# Patient Record
Sex: MALE | Race: OTHER
[De-identification: names, ages, dates, MRNs, and addresses within clinical notes are randomized per-mention and may not be internally consistent; named-entity substitution may affect disease eponyms.]

---

## 2019-07-01 ENCOUNTER — HOSPITAL ENCOUNTER (EMERGENCY)
Dept: HOSPITAL 74 - JER | Age: 39
Discharge: HOME | End: 2019-07-01
Payer: COMMERCIAL

## 2019-07-01 VITALS — DIASTOLIC BLOOD PRESSURE: 74 MMHG | HEART RATE: 104 BPM | TEMPERATURE: 97.8 F | SYSTOLIC BLOOD PRESSURE: 143 MMHG

## 2019-07-01 VITALS — BODY MASS INDEX: 44.4 KG/M2

## 2019-07-01 DIAGNOSIS — R10.31: Primary | ICD-10-CM

## 2019-07-01 LAB
ALBUMIN SERPL-MCNC: 3.5 G/DL (ref 3.4–5)
ALP SERPL-CCNC: 128 U/L (ref 45–117)
ALT SERPL-CCNC: 52 U/L (ref 13–61)
ANION GAP SERPL CALC-SCNC: 6 MMOL/L (ref 8–16)
APPEARANCE UR: CLEAR
AST SERPL-CCNC: 34 U/L (ref 15–37)
BASOPHILS # BLD: 0.8 % (ref 0–2)
BILIRUB SERPL-MCNC: 0.4 MG/DL (ref 0.2–1)
BILIRUB UR STRIP.AUTO-MCNC: NEGATIVE MG/DL
BUN SERPL-MCNC: 10.2 MG/DL (ref 7–18)
CALCIUM SERPL-MCNC: 8.8 MG/DL (ref 8.5–10.1)
CHLORIDE SERPL-SCNC: 104 MMOL/L (ref 98–107)
CO2 SERPL-SCNC: 28 MMOL/L (ref 21–32)
COLOR UR: YELLOW
CREAT SERPL-MCNC: 1 MG/DL (ref 0.55–1.3)
DEPRECATED RDW RBC AUTO: 13.9 % (ref 11.9–15.9)
EOSINOPHIL # BLD: 1.6 % (ref 0–4.5)
GLUCOSE SERPL-MCNC: 183 MG/DL (ref 74–106)
HCT VFR BLD CALC: 44.4 % (ref 35.4–49)
HGB BLD-MCNC: 14.9 GM/DL (ref 11.7–16.9)
KETONES UR QL STRIP: (no result)
LEUKOCYTE ESTERASE UR QL STRIP.AUTO: NEGATIVE
LIPASE SERPL-CCNC: 209 U/L (ref 73–393)
LYMPHOCYTES # BLD: 29.6 % (ref 8–40)
MCH RBC QN AUTO: 29.6 PG (ref 25.7–33.7)
MCHC RBC AUTO-ENTMCNC: 33.5 G/DL (ref 32–35.9)
MCV RBC: 88.2 FL (ref 80–96)
MONOCYTES # BLD AUTO: 7.1 % (ref 3.8–10.2)
NEUTROPHILS # BLD: 60.9 % (ref 42.8–82.8)
NITRITE UR QL STRIP: NEGATIVE
PH UR: 6 [PH] (ref 5–8)
PLATELET # BLD AUTO: 212 K/MM3 (ref 134–434)
PMV BLD: 9.6 FL (ref 7.5–11.1)
POTASSIUM SERPLBLD-SCNC: 4.2 MMOL/L (ref 3.5–5.1)
PROT SERPL-MCNC: 7.1 G/DL (ref 6.4–8.2)
PROT UR QL STRIP: NEGATIVE
PROT UR QL STRIP: NEGATIVE
RBC # BLD AUTO: 5.03 M/MM3 (ref 4–5.6)
SODIUM SERPL-SCNC: 138 MMOL/L (ref 136–145)
SP GR UR: 1.02 (ref 1.01–1.03)
UROBILINOGEN UR STRIP-MCNC: 1 MG/DL (ref 0.2–1)
WBC # BLD AUTO: 9.2 K/MM3 (ref 4–10)

## 2019-07-01 PROCEDURE — 3E033GC INTRODUCTION OF OTHER THERAPEUTIC SUBSTANCE INTO PERIPHERAL VEIN, PERCUTANEOUS APPROACH: ICD-10-PCS

## 2019-07-01 PROCEDURE — 3E0333Z INTRODUCTION OF ANTI-INFLAMMATORY INTO PERIPHERAL VEIN, PERCUTANEOUS APPROACH: ICD-10-PCS

## 2019-07-01 NOTE — PDOC
*Physical Exam





- Vital Signs


 Last Vital Signs











Temp Pulse Resp BP Pulse Ox


 


 97.8 F   104 H  18   143/74   94 L


 


 07/01/19 08:42  07/01/19 08:42  07/01/19 08:42  07/01/19 08:42  07/01/19 08:42














ED Treatment Course





- LABORATORY


CBC & Chemistry Diagram: 


 07/01/19 09:35





 07/01/19 09:35





Medical Decision Making





- Medical Decision Making





07/01/19 09:23


39 yo M presenting to the ER with a complaint of testicular pain





Pt seen by Midlevel Provider under my direct supervision


Pt interviewed and examined


Ancillary studies reviewed


   Scrotal US- nml, no torsion, no masses, no varicocele


   CT - no stones, CT does not comment on presence of hernia





I agree with plan as outlined by Midlevel Provider


D/c with prompt PMD follow up 


Return for worsening symptoms


07/01/19 11:32





07/01/19 11:34





07/01/19 11:35





07/01/19 12:09








*DC/Admit/Observation/Transfer


Diagnosis at time of Disposition: 


 Abdominal pain








- Discharge Dispostion


Disposition: HOME


Condition at time of disposition: Improved





- Prescriptions


Prescriptions: 


Ondansetron HCl [Zofran] 4 mg PO TID PRN #12 tablet


 PRN Reason: Nausea And/Or Vomiting





- Referrals


Referrals: 


Napoleon Blevins MD [Primary Care Provider] - 





- Patient Instructions


Printed Discharge Instructions:  DI for Abdominal Pain-Adult


Additional Instructions: 


Take Zofran as needed for nausea. 


May take Aleve for discomfort.


Please bring copy of your labs and imaging results with you to Dr. Blevins's 

office on July 3rd. 





- Post Discharge Activity

## 2019-07-01 NOTE — PDOC
History of Present Illness





- General


Chief Complaint: Pain, Acute


Stated Complaint: TESTICULAR PAIN/ ABD.PAIN


Time Seen by Provider: 07/01/19 09:00


History Source: Patient


Exam Limitations: No Limitations





- History of Present Illness


Travel History: No


Initial Comments: 





07/01/19 09:07


38-year-old male presents to ED with complaints of lower abdomen sharp pressure 

worsen in the testicle area radiating to his right flank area. Patient states 

approximately one week ago went to his primary care doctor which had noted 

blood in his urine without continuation of workup including imaging. Patient 

denies history of renal colic, gallstones, recent travel, GI history, fever, 

chills, diarrhea but does state nausea. patient denies any protrusion to his 

abdomen, penile discharge or dysuria.


Timing/Duration: reports: getting worse


Quality: reports: moderate


Abdominal Pain Onset Location: reports: flank


Pain Radiation: reports: RLQ, other (testicles)


Aggravating Factors: improves with: None


Alleviating Factors: improves with: None





Past History





- Travel


Traveled outside of the country in the last 30 days: No


Close contact w/someone who was outside of country & ill: No





- Past Medical History


Allergies/Adverse Reactions: 


 Allergies











Allergy/AdvReac Type Severity Reaction Status Date / Time


 


Sulfa (Sulfonamide Allergy   Verified 07/01/19 08:45





Antibiotics)     











Home Medications: 


Ambulatory Orders





Divalproex *ER* [Depakote *ER* -] 250 mg PO BID #14 tablet. 05/19/14 


Divalproex *ER* [Depakote *ER* -] 250 mg PO DAILY #15 tablet. 05/19/14 


Phenobarbital 64.8 mg PO BID 05/19/14 


levETIRAcetam [Keppra -] 2,000 mg PO BID 05/19/14 


levETIRAcetam [Keppra -] 2,000 mg PO BID #56 tablet 05/19/14 


levETIRAcetam [Keppra -] 500 mg PO BID #60 tablet 05/19/14 








COPD: No


Seizures: Yes





- Surgical History


Cholecystectomy: Yes





- Suicide/Smoking/Psychosocial Hx


Smoking History: Never smoked


Number of Cigarettes Smoked Daily: 0


Hx Alcohol Use: Yes


Drug/Substance Use Hx: No


Patient Lives Alone: No


Lives with/in: spouse/SO





**Review of Systems





- Review of Systems


Able to Perform ROS?: Yes


Constitutional: No: Symptoms Reported


HEENTM: No: Symptoms Reported


Respiratory: No: Symptoms reported


Cardiac (ROS): No: Symptoms Reported


ABD/GI: Yes: Nausea, Abdominal cramping


: Yes: Flank Pain, Hematuria, Testicular Pain.  No: Dysuria, Discharge, 

Testicular Mass, Testicular Swelling, Lesions


Musculoskeletal: No: Symptoms Reported


Integumentary: No: Symptoms Reported


Neurological: No: Symptoms reported


Endocrine: No: Symptoms Reported


Hematologic/Lymphatic: No: Symptoms Reported





*Physical Exam





- Vital Signs


 Last Vital Signs











Temp Pulse Resp BP Pulse Ox


 


 97.8 F   104 H  18   143/74   94 L


 


 07/01/19 08:42  07/01/19 08:42  07/01/19 08:42  07/01/19 08:42  07/01/19 08:42














- Physical Exam


General Appearance: Yes: Nourished, Appropriately Dressed.  No: Apparent 

Distress


HEENT: negative: Pale Conjunctivae


Respiratory/Chest: positive: Lungs Clear, Normal Breath Sounds.  negative: 

Respiratory Distress, Accessory Muscle Use


Cardiovascular: positive: Regular Rhythm, Tachycardia.  negative: Murmur


Gastrointestinal/Abdominal: positive: Soft, Tenderness (right upper quadrant. 

positive Richardson's)


Male Genitalia: positive: normal genitalia.  negative: discharge, testicular 

tenderness, testicular mass, epididymus tender


Musculoskeletal: positive: CVA Tenderness (R)


Extremity: positive: Normal Inspection


Integumentary: positive: Normal Color, Warm, Moist


Neurologic: positive: Motor Strength 5/5 (ambulatory)





ED Treatment Course





- LABORATORY


CBC & Chemistry Diagram: 


 07/01/19 09:35





 07/01/19 09:35





- RADIOLOGY


Radiology Studies Ordered: 














 Category Date Time Status


 


 SPIRAL- RENAL-STONE CT [CT] Stat CT Scan  07/01/19 09:04 Ordered


 


 SCROTUM AND CONTENTS US [US] Stat Ultrasound  07/01/19 09:02 Ordered














Medical Decision Making





- Medical Decision Making





07/01/19 09:30


IChief complaint: Right flank pain with nausea radiating to his testicle region 

greater in the right testicle. Patient denies any other symptoms except for 

noted hematuria at his PCPs office last week


Exam: Right upper quadrant right flank and right suprapubic pain. No testicular 

edema or mass palpable. No discharge 


plan: labs, urine, antiemetics,Toradol, IV fluids scrotal ultrasound along with 

spiral CT


07/01/19 11:45


 Laboratory Tests











  07/01/19 07/01/19 07/01/19





  09:35 09:35 09:35


 


WBC   9.2 


 


Hgb   14.9 


 


Hct   44.4 


 


Absolute Neuts (auto)   5.6 


 


Sodium    138


 


Potassium    4.2


 


Chloride    104


 


Carbon Dioxide    28


 


Anion Gap    6 L


 


BUN    10.2


 


Creatinine    1.0


 


Est GFR (CKD-EPI)AfAm    111.73


 


Est GFR (CKD-EPI)NonAf    96.40


 


Random Glucose    183 H


 


Calcium    8.8


 


Total Bilirubin    0.4


 


AST    34


 


ALT    52


 


Alkaline Phosphatase    128 H


 


Total Protein    7.1


 


Albumin    3.5


 


Lipase    209


 


Urine Ketones  Trace H  


 


Urine Nitrite  Negative  


 


Ur Leukocyte Esterase  Negative  








Scrotal ultrasound negative for acute pathology. Abdominal CT shows multiple 

mildly prominent right lower quadrant mesenteric lymph nodes seen. Correlation 

with 3 month follow-up MRI or CT suggested to document stability. Case 

discussed with patient and has a follow-up appointment with his PCP on July 3rd 

will be given copy of all labs and results.


Patient otherwise states nausea and pain has subsided. 


Will discharge patient home with Zofran.








*DC/Admit/Observation/Transfer


Diagnosis at time of Disposition: 


 Abdominal pain








- Discharge Dispostion


Disposition: HOME


Condition at time of disposition: Improved





- Referrals


Referrals: 


Napoleon Blevisn MD [Primary Care Provider] - 





- Patient Instructions


Printed Discharge Instructions:  DI for Abdominal Pain-Adult


Additional Instructions: 


Take Zofran as needed for nausea. 


May take Aleve for discomfort.


Please bring copy of your labs and imaging results with you to Dr. Blevins's 

office on July 3rd. 





- Post Discharge Activity

## 2020-01-02 ENCOUNTER — HOSPITAL ENCOUNTER (OUTPATIENT)
Dept: HOSPITAL 74 - JER | Age: 40
Setting detail: OBSERVATION
LOS: 2 days | Discharge: HOME | End: 2020-01-04
Attending: FAMILY MEDICINE | Admitting: GENERAL ACUTE CARE HOSPITAL
Payer: COMMERCIAL

## 2020-01-02 VITALS — BODY MASS INDEX: 44.1 KG/M2

## 2020-01-02 DIAGNOSIS — Z88.2: ICD-10-CM

## 2020-01-02 DIAGNOSIS — R74.0: ICD-10-CM

## 2020-01-02 DIAGNOSIS — G40.802: Primary | ICD-10-CM

## 2020-01-02 LAB
ALBUMIN SERPL-MCNC: 3.9 G/DL (ref 3.4–5)
ALP SERPL-CCNC: 165 U/L (ref 45–117)
ALT SERPL-CCNC: 131 U/L (ref 13–61)
ANION GAP SERPL CALC-SCNC: 11 MMOL/L (ref 8–16)
AST SERPL-CCNC: 70 U/L (ref 15–37)
BASOPHILS # BLD: 0.5 % (ref 0–2)
BILIRUB SERPL-MCNC: 0.8 MG/DL (ref 0.2–1)
BUN SERPL-MCNC: 8.8 MG/DL (ref 7–18)
CALCIUM SERPL-MCNC: 9 MG/DL (ref 8.5–10.1)
CHLORIDE SERPL-SCNC: 105 MMOL/L (ref 98–107)
CO2 SERPL-SCNC: 22 MMOL/L (ref 21–32)
CREAT SERPL-MCNC: 1.2 MG/DL (ref 0.55–1.3)
DEPRECATED RDW RBC AUTO: 13.5 % (ref 11.9–15.9)
EOSINOPHIL # BLD: 0.6 % (ref 0–4.5)
GLUCOSE SERPL-MCNC: 144 MG/DL (ref 74–106)
HCT VFR BLD CALC: 47.2 % (ref 35.4–49)
HGB BLD-MCNC: 15.5 GM/DL (ref 11.7–16.9)
LYMPHOCYTES # BLD: 32.7 % (ref 8–40)
MCH RBC QN AUTO: 28.5 PG (ref 25.7–33.7)
MCHC RBC AUTO-ENTMCNC: 32.8 G/DL (ref 32–35.9)
MCV RBC: 86.9 FL (ref 80–96)
MONOCYTES # BLD AUTO: 5.2 % (ref 3.8–10.2)
NEUTROPHILS # BLD: 61 % (ref 42.8–82.8)
PLATELET # BLD AUTO: 269 K/MM3 (ref 134–434)
PMV BLD: 9.9 FL (ref 7.5–11.1)
POTASSIUM SERPLBLD-SCNC: 3.9 MMOL/L (ref 3.5–5.1)
PROT SERPL-MCNC: 7.8 G/DL (ref 6.4–8.2)
RBC # BLD AUTO: 5.43 M/MM3 (ref 4–5.6)
SODIUM SERPL-SCNC: 138 MMOL/L (ref 136–145)
WBC # BLD AUTO: 12.2 K/MM3 (ref 4–10)

## 2020-01-02 PROCEDURE — 3E0337Z INTRODUCTION OF ELECTROLYTIC AND WATER BALANCE SUBSTANCE INTO PERIPHERAL VEIN, PERCUTANEOUS APPROACH: ICD-10-PCS | Performed by: FAMILY MEDICINE

## 2020-01-02 PROCEDURE — G0378 HOSPITAL OBSERVATION PER HR: HCPCS

## 2020-01-02 PROCEDURE — 3E033NZ INTRODUCTION OF ANALGESICS, HYPNOTICS, SEDATIVES INTO PERIPHERAL VEIN, PERCUTANEOUS APPROACH: ICD-10-PCS | Performed by: FAMILY MEDICINE

## 2020-01-02 PROCEDURE — 3E013GC INTRODUCTION OF OTHER THERAPEUTIC SUBSTANCE INTO SUBCUTANEOUS TISSUE, PERCUTANEOUS APPROACH: ICD-10-PCS | Performed by: FAMILY MEDICINE

## 2020-01-02 PROCEDURE — 3E023GC INTRODUCTION OF OTHER THERAPEUTIC SUBSTANCE INTO MUSCLE, PERCUTANEOUS APPROACH: ICD-10-PCS | Performed by: FAMILY MEDICINE

## 2020-01-02 RX ADMIN — LEVETIRACETAM SCH MG: 100 INJECTION, SOLUTION, CONCENTRATE INTRAVENOUS at 23:09

## 2020-01-02 RX ADMIN — HEPARIN SODIUM SCH UNIT: 5000 INJECTION, SOLUTION INTRAVENOUS; SUBCUTANEOUS at 23:09

## 2020-01-02 NOTE — PDOC
Documentation entered by Marija Condon SCRIBE, acting as scribe for Desmond Nails MD.








Desmond Nails MD:  This documentation has been prepared by the [employer] rankin Brenda, SCRIBE, under my direction and personally reviewed by me in its entirety.  I 

confirm that the documentation accurately reflects all work, treatment, 

procedures, and medical decision making performed by me.  





Attending Attestation





- Resident


Resident Name: Noe Kaiser





- ED Attending Attestation


I have performed the following: I have examined & evaluated the patient, The 

case was reviewed & discussed with the resident, I agree w/resident's findings 

& plan, Exceptions are as noted





- HPI


HPI: 





01/02/20 16:17


39 M with h/o epilepsy on keppra presenting to ED with increased seizure 

frequency. Per mother, pt has been having frequent partial seizures. He will 

have twitching movements and stare into space. Mother reports several over the 

past week. Today, pt had a GTC, prompting her to call EMS. She also notes pt 

has had 1 week of nasal congestion and subjective fevers. 


In ED, pt initially presented somnolent and post-ictal. However, while in ED he 

had witnessed GTC, followed by post-ictal period during which he was extremely 

agitated, requiring both physical and chemical restraints.





Upon reassessment, pt is more awake and cooperative. Now only complains of pain 

in both shoulders. Denies F/C. Denies Cp/SOB. Denies HA/N/V. Denies neck 

stiffness.








- Physicial Exam


PE: 





01/02/20 16:21


GENERAL: Awake, alert, and fully oriented, in no acute distress.


HEAD: No signs of trauma


EYES: PERRLA, EOMI, sclera anicteric, conjunctiva clear


ENT: Auricles normal inspection, hearing grossly normal, nares patent, 

oropharynx clear without exudates. Moist mucosa


NECK: Nontender, no stepoffs, Normal ROM, supple, no lymphadenopathy, JVD, or 

masses


LUNGS: Breath sounds equal, clear to auscultation bilaterally.  No wheezes, and 

no crackles


HEART: Regular rate and rhythm, normal S1 and S2, no murmurs, rubs or gallops


ABDOMEN: Soft, nontender, normoactive bowel sounds.  No guarding, no rebound.  

No masses


EXTREMITIES: Normal range of motion, no edema.  No clubbing or cyanosis. No 

cords, erythema, or tenderness


NEUROLOGICAL: Cranial nerves II through XII intact. 5/5 strength and sensation 

in all extremities, Normal speech, normal gait, normal cerebellar function


SKIN: Warm, Dry, normal turgor, no rashes or lesions noted.





- Critical Care Time


Total Critical Care Time: 60


Critical Care Statement: The care of this patient involved high complexity 

decision making to prevent further life threatening deterioration of the patient

's condition and/or to evaluate & treat vital organ system(s) failure or risk 

of failure.





- Medical Decision Making





01/02/20 16:23


39 M with increased seizure frequency. Will check for infectious/metabolic 

process.


- Labs


- CT head


- Neuro c/s

## 2020-01-02 NOTE — CON.NEURO
Consult


Consult Specialty:: [facility]


Referred by:: ER Dr West 





- History of Present Illness


History of Present Illness: 





39 years old man with history of Epispsy and Sulfa allergy came in with 

breakthrough seizure 


Patient was tappered off VPA as he wa sdoing well 


Now on LKeppra I spoke to ER 


Patient was reloaded on Keppra 


No seizure since admission 


Patient seen on [facility] tele 


Noted [facility] head CT results 








- History Source


History Provided By: Patient, Family Member


Limitations to Obtaining History: No Limitations





- Past Medical History


CNS: Yes: Seizure





- Past Surgical History


Past Surgical History: Yes: Cholecystectomy





- Alcohol/Substance Use


Hx Alcohol Use: Yes


History of Substance Use: reports: None





- Smoking History


Smoking history: Never smoked


Aproximately how many cigarettes per day: 0





- Social History


ADL: Independent


History of Recent Travel: No





Home Medications





- Allergies


Allergies/Adverse Reactions: 


 Allergies











Allergy/AdvReac Type Severity Reaction Status Date / Time


 


Sulfa (Sulfonamide Allergy   Verified 01/02/20 12:47





Antibiotics)     














- Home Medications


Home Medications: 


Ambulatory Orders





D-Methorphan/PE/Acetaminophen [Theraflu Expressmax Cold-Cough] 245.5 ml PO PRN 

01/02/20 


Guaifenesin [Robitussin] 10 ml PO PRN 01/02/20 


Multivitamin [Multiple Vitamins] 1 each PO DAILY 01/02/20 


Naproxen Sodium [Aleve] 220 mg PO PRN 01/02/20 


levETIRAcetam [Keppra -] 1,000 mg PO BID 01/02/20 


levETIRAcetam [Keppra -] 250 mg PO BID 01/02/20 











Family Medical History


Family History: Unremarkable





Review of Systems





- Review of Systems


Constitutional: reports: No Symptoms


Eyes: reports: No Symptoms


HENT: reports: No Symptoms


Neurological: reports: No Symptoms





Physical Exam-Neuro


Vital Signs: 


 Vital Signs











Temperature  97.8 F   01/02/20 18:30


 


Pulse Rate  96 H  01/02/20 18:30


 


Respiratory Rate  20   01/02/20 18:30


 


Blood Pressure  116/41 L  01/02/20 18:30


 


O2 Sat by Pulse Oximetry (%)  100   01/02/20 18:30











Constitutional: Yes: Well Nourished


Neck: Yes: WNL


Labs: 


 CBC, BMP





 01/02/20 14:45 





 01/02/20 14:45 











- Neuro Exam


Level Of Consciousness: Yes: Oriented to Person, Oriented to Place, Oriented to 

Time


Eyes: Yes: PERRLA


Speech: WNL


Dominant Hand: Right


Cranial Nerves II-XII Intact: Yes


Gag: Present


DTR's: 1+ Left Bicep, 1+ Right Bicep, 1+ Left Brachioradialis, 1+ Right 

Brachioradialis


Response to light touch: Normal


Response to pain prick: Normal


Response to temperature: Normal


Motor Strength: 3/5: Left Arm, Right Arm, Left Leg, Right Leg


Gait: Deferred





Imaging





- Results


Cat Scan: Image Reviewed





Problem List





- Problems


(1) Seizure


Assessment/Plan: 


Breakthrough seizure on monotherapy with Keppra 


probbaly due to recent flu or cold 


High LFTs they were normal 07/2019


Keppra does not affect the liver 





1. Neuro cheks 


2. Keppra 1500 mg po q12


3. Hepatitis profile 


4. GGT 


5. Seizure precautions


6. Ativan prn seizure 








Thank you for [facility] kind referral 








Aramis Allison 





Code(s): R56.9 - UNSPECIFIED CONVULSIONS   


Qualifiers: 


   Convulsion type: unspecified   Qualified Code(s): R56.9 - Unspecified 

convulsions

## 2020-01-02 NOTE — HP
Admitting History and Physical





- Primary Care Physician


PCP: Dr. Ortega 





- Admission


Chief Complaint: Seizures


History of Present Illness: 





39 year old male with PMHx epilepsy presenting with seizure. Pt is poor 

historian, doesn't remember symptoms. Spoke to mother at bedside, stated 

patient had been coughing/nasal congestion and subjective fever for about 1 week

, and multiple petit mal seizures (amnesia, blank stares) lasting up to 5 secs. 

Patient has been taking dayquil without relief. Patient states he is compliant 

with medication when asked, has been tapered off depakote from November - mid 

December under Dr Allison's guidance due to side effects. Currently only taking 

keppra for anti-seizure. Mother noted patient usually has multiple petit 

seizures before grand seizure lasting >20min. Patient denies alcohol, illicit 

drug use. Denies chest pain, SOB, urinary/bowel changes.





History Source: Patient, Family Member


Limitations to Obtaining History: Clinical Condition, Other (recieved history 

from mother at bedside)





- Past Medical History


CNS: Yes: Seizure





- Past Surgical History


Past Surgical History: Yes: Cholecystectomy





- Smoking History


Smoking history: Never smoked


Aproximately how many cigarettes per day: 0





- Alcohol/Substance Use


Hx Alcohol Use: Yes


History of Substance Use: reports: None





- Social History


Usual Living Arrangement: Yes: With Parent


ADL: Independent


History of Recent Travel: No





Home Medications





- Allergies


Allergies/Adverse Reactions: 


 Allergies











Allergy/AdvReac Type Severity Reaction Status Date / Time


 


Sulfa (Sulfonamide Allergy   Verified 20 12:47





Antibiotics)     














- Home Medications


Home Medications: 


Ambulatory Orders





D-Methorphan/PE/Acetaminophen [Theraflu Expressmax Cold-Cough] 245.5 ml PO PRN 

20 


Guaifenesin [Robitussin] 10 ml PO PRN 20 


Multivitamin [Multiple Vitamins] 1 each PO DAILY 20 


Naproxen Sodium [Aleve] 220 mg PO PRN 20 


levETIRAcetam [Keppra -] 1,000 mg PO BID 20 


levETIRAcetam [Keppra -] 250 mg PO BID 20 











Family Medical History


Family Hx Cardiac Disorders: Father ( with MI)





Review of Systems





- Review of Systems


Constitutional: reports: Fever


Eyes: reports: No Symptoms


HENT: reports: Nasal Congestion


Neck: reports: No Symptoms


Cardiovascular: reports: No Symptoms


Respiratory: reports: No Symptoms


Gastrointestinal: reports: No Symptoms


Genitourinary: reports: No Symptoms


Musculoskeletal: reports: No Symptoms


Integumentary: reports: No Symptoms


Neurological: reports: Seizure


Endocrine: reports: No Symptoms


Hematology/Lymphatic: reports: No Symptoms


Psychiatric: reports: No Symptoms





Physical Examination


Vital Signs: 


 Vital Signs











Temperature  97.8 F   20 18:30


 


Pulse Rate  96 H  20 18:30


 


Respiratory Rate  20   20 18:30


 


Blood Pressure  116/41 L  20 18:30


 


O2 Sat by Pulse Oximetry (%)  100   20 18:30











Constitutional: Yes: Mild Distress, Obese


Eyes: Yes: Conjunctiva Clear, EOM Intact


HENT: Yes: Atraumatic, Normocephalic


Neck: Yes: Supple, Trachea Midline


Cardiovascular: Yes: Regular Rate and Rhythm


Respiratory: Yes: Regular, CTA Bilaterally


Gastrointestinal: Yes: Normal Bowel Sounds, Soft


Musculoskeletal: Yes: WNL


Extremities: Yes: WNL


Edema: No


Peripheral Pulses WNL: Yes


Integumentary: Yes: WNL


Neurological: Yes: Alert, Lethargy


Labs: 


 CBC, BMP





 20 14:45 





 20 14:45 











Imaging





- Results


Chest X-ray: Report Reviewed (CXR: weak inspiration, prominent central marking, 

possible oxygen mask artifact)


Cat Scan: Report Reviewed (CT head: no acute intracranial pathology, 

nonspecific 0.4 cm sclerotic focus is noted within the right paramedian aspect 

of the upper clivus a bone island.)


EKG: Report Reviewed (EKG sinus tachycardia , QTc 468, no ST changes)


Other: Report Reviewed (, , AST 70, )





Problem List





- Problems


(1) Seizure


Code(s): R56.9 - UNSPECIFIED CONVULSIONS   


Qualifiers: 


   Convulsion type: unspecified   Qualified Code(s): R56.9 - Unspecified 

convulsions   





(2) Elevated transaminase level


Code(s): R74.0 - NONSPEC ELEV OF LEVELS OF TRANSAMNS & LACTIC ACID DEHYDRGNSE   





Assessment/Plan





39 year old male with PMHx epilepsy presenting with seizure. Pt is poor 

historian, doesn't remember symptoms. Spoke to mother at bedside, stated 

patient had been coughing/nasal congestion and subjective fever for about 1 week

, and multiple petit mal seizures (amnesia, blank stares) lasting up to 5 secs.








# Seizures likely due to recent medication change (tapered off depakote last 

month) ? r/o infection vs metabolic 


CT head: no acute intracranial pathology, nonspecific 0.4 cm sclerotic focus is 

noted within the right paramedian aspect of the upper clivus a bone island.


CXR: no consolidation, mediastinum covered by oxygen mask


EKG sinus tachycardia , QTc 468, no ST changes


Trop: negative 


had episode of seizure in ED @ 1:20Pm lasting 5 min 


Neurology was consulted by ED 


Given IM 4 ativan, 10 haldol, 6 versed, IV tylenol, 2L NS, 1g keppra


load with keppra 


- continue with 2 mg IV push q 6 hours


- continue with keppra 1,250mg IVPB BID


- follow up valproic, levetiracetam, and pehobarital level 


- follow up UCX, BCX


- safety/fall precaution 


- follow up official read XR b/l shoulder


- follow up cbc, cmp in AM


- follow up EEG


- Neurology to follow in AM 








# Elevated LFTs likely due to seizure 


, , AST 70


- repeat labs in AM, if trending upward consider ABD US


- continue with IVF 











FEN: NPO, IVF, monitor lytes 


VTE: heparin SQ


Dispo: tele

















Visit type





- Emergency Visit


Emergency Visit: Yes


ED Registration Date: 20


Care time: The patient presented to the Emergency Department on the above date 

and was hospitalized for further evaluation of their emergent condition.





- New Patient


This patient is new to me today: Yes


Date on this admission: 20





- Critical Care


Critical Care patient: No

## 2020-01-02 NOTE — PDOC
History of Present Illness





- General


Chief Complaint: Seizure


Stated Complaint: SEIZURE


Time Seen by Provider: 01/02/20 12:59


History Source: Patient, Parent(s) (mother)


Exam Limitations: No Limitations





- History of Present Illness


Initial Comments: 





01/02/20 15:56


39yM w PMHx epilepsy presenting w seizure. Pt is poor historian, doesn't 

remember symptoms, needed mother's guidance to elicit hx. Endorsed 1 week nasal 

congestion, subjective fevers, multiple petit mal seizures (amnesia, blank 

stares) lasting up to 5s. Has been taking cough suppressant and Dayquil without 

relief. Has been tapered off depakote from November - mid December under Dr Allison's guidance d/t side effects. Currently only taking keppra for anti-

seizure. Mother noted pt usually has multiple petit seizures before grand 

seizure lasting >20min. Denies alcohol, illicit drug use. Denies cough, chest 

pain, SOB, urinary/bowel mvmt changes.





PCP Dr Blevins








Past History





- Past Medical History


Allergies/Adverse Reactions: 


 Allergies











Allergy/AdvReac Type Severity Reaction Status Date / Time


 


Sulfa (Sulfonamide Allergy   Verified 01/02/20 12:47





Antibiotics)     











Home Medications: 


Ambulatory Orders





D-Methorphan/PE/Acetaminophen [Theraflu Expressmax Cold-Cough] 245.5 ml PO PRN 

01/02/20 


Guaifenesin [Robitussin] 10 ml PO PRN 01/02/20 


Multivitamin [Multiple Vitamins] 1 each PO DAILY 01/02/20 


Naproxen Sodium [Aleve] 220 mg PO PRN 01/02/20 


levETIRAcetam [Keppra -] 1,000 mg PO BID 01/02/20 


levETIRAcetam [Keppra -] 250 mg PO BID 01/02/20 








COPD: No


Seizures: Yes





- Surgical History


Cholecystectomy: Yes





- Psycho Social/Smoking Cessation Hx


Smoking History: Never smoked


Number of Cigarettes Smoked Daily: 0


Hx Alcohol Use: Yes


Drug/Substance Use Hx: No





**Review of Systems





- Review of Systems


Constitutional: Yes: Fever.  No: Chills


HEENTM: Yes: Nose Congestion.  No: Eye Pain, Nose Pain, Throat Pain


Respiratory: No: Cough, Shortness of Breath


Cardiac (ROS): No: Chest Pain, Palpitations


ABD/GI: No: Abdominal Distended, Constipated, Diarrhea, Nausea, Vomiting


: No: Burning, Dysuria, Hematuria


Musculoskeletal: No: Back Pain, Joint Pain


Integumentary: No: Bruising, Flushing, Lesions


Neurological: Yes: Seizure.  No: Headache, Tingling


Psychiatric: No: Anxiety, Depression, Stressors


Endocrine: No: Excessive Sweating, Flushing, Intolerance to Cold, Intolerance 

to Heat


Hematologic/Lymphatic: No: Anemia, Blood Clots





*Physical Exam





- Vital Signs


 Last Vital Signs











Temp Pulse Resp BP Pulse Ox


 


 97.9 F   100 H  18   128/81   97 


 


 01/02/20 12:41  01/02/20 12:41  01/02/20 12:41  01/02/20 12:41  01/02/20 12:41














- Physical Exam


General Appearance: Yes: Nourished, Appropriately Dressed, Mild Distress


HEENT: positive: EOMI, ERLIN, Normal Voice, Nasal Congestion.  negative: Scleral 

Icterus (R), Scleral Icterus (L), Rhinorrhea


Neck: positive: Supple.  negative: Tender, Rigid


Respiratory/Chest: positive: Lungs Clear, Normal Breath Sounds.  negative: 

Chest Tender, Respiratory Distress, Crackles, Rales, Rhonchi, Stridor, Wheezing


Cardiovascular: positive: Regular Rhythm, Regular Rate, S1, S2.  negative: Edema

, Murmur


Gastrointestinal/Abdominal: positive: Normal Bowel Sounds, Flat, Soft.  negative

: Tender, Organomegaly


Musculoskeletal: negative: CVA Tenderness (R), CVA Tenderness (L)


Neurologic: positive: CNs II-XII NML intact, Fully Oriented, Alert, Normal Mood/

Affect (flat affect, intermittent confusion), Normal Response, Motor Strength 5/

5, Responsive, Other (delayed response).  negative: Sensory Deficit, Confused, 

Disoriented





ED Treatment Course





- LABORATORY


CBC & Chemistry Diagram: 


 01/02/20 14:45





 01/02/20 14:45





Medical Decision Making





- Medical Decision Making





01/02/20 14:12


EKG sinus tachycardia , QTc 468, no ST changes


CXR poor image, poor inspiration, no consolidation, mediastinum covered by 

oxygen mask


, , AST 70, 





---





39yM w PMHx epilepsy presenting w 1wk petit seizure and grand seizure in ED.


seizure likely d/t medication under dosing (tapered off depakote last month). 

Low concern for ACS (no ST changes, no CP) vs PNA (no focal consolidation). 

Elevated LFTs d/t seizure. Minimal rhabdomyolysis (). Pending drug levels

, UA





120pm eyes rolled back, tongue biting, extremity convulsions lasting 5 min with 

agitation/confusion post ictal state until 150pm. Restraints placed.





Given IM 4 ativan, 10 haldol, 6 versed, IV tylenol, 2L NS, 1g keppra





Consulted Dr Allison - advised load with keppra, get drug levels, ED workup, 

admit to hospital





Admitted tele Dr Rodney for seizure








Discharge





- Discharge Information


Problems reviewed: Yes


Clinical Impression/Diagnosis: 


Seizure


Qualifiers:


 Convulsion type: unspecified Qualified Code(s): R56.9 - Unspecified convulsions





Condition: Guarded





- Follow up/Referral





- Patient Discharge Instructions





- Post Discharge Activity

## 2020-01-03 LAB
ALBUMIN SERPL-MCNC: 3.2 G/DL (ref 3.4–5)
ALP SERPL-CCNC: 139 U/L (ref 45–117)
ALT SERPL-CCNC: 114 U/L (ref 13–61)
AMPHET UR-MCNC: NEGATIVE NG/ML
ANION GAP SERPL CALC-SCNC: 5 MMOL/L (ref 8–16)
APPEARANCE UR: CLEAR
AST SERPL-CCNC: 86 U/L (ref 15–37)
BARBITURATES UR-MCNC: NEGATIVE NG/ML
BENZODIAZ UR SCN-MCNC: POSITIVE NG/ML
BILIRUB SERPL-MCNC: 0.9 MG/DL (ref 0.2–1)
BILIRUB UR STRIP.AUTO-MCNC: NEGATIVE MG/DL
BUN SERPL-MCNC: 7.3 MG/DL (ref 7–18)
CALCIUM SERPL-MCNC: 8.3 MG/DL (ref 8.5–10.1)
CHLORIDE SERPL-SCNC: 109 MMOL/L (ref 98–107)
CO2 SERPL-SCNC: 25 MMOL/L (ref 21–32)
COCAINE UR-MCNC: NEGATIVE NG/ML
COLOR UR: YELLOW
CREAT SERPL-MCNC: 0.8 MG/DL (ref 0.55–1.3)
DEPRECATED RDW RBC AUTO: 13.9 % (ref 11.9–15.9)
GLUCOSE SERPL-MCNC: 98 MG/DL (ref 74–106)
HCT VFR BLD CALC: 39.8 % (ref 35.4–49)
HGB BLD-MCNC: 13.2 GM/DL (ref 11.7–16.9)
KETONES UR QL STRIP: NEGATIVE
LEUKOCYTE ESTERASE UR QL STRIP.AUTO: NEGATIVE
MCH RBC QN AUTO: 28.5 PG (ref 25.7–33.7)
MCHC RBC AUTO-ENTMCNC: 33.1 G/DL (ref 32–35.9)
MCV RBC: 86.3 FL (ref 80–96)
METHADONE UR-MCNC: NEGATIVE NG/ML
NITRITE UR QL STRIP: NEGATIVE
OPIATES UR QL SCN: NEGATIVE NG/ML
PCP UR QL SCN: NEGATIVE NG/ML
PH UR: 6.5 [PH] (ref 5–8)
PLATELET # BLD AUTO: 227 K/MM3 (ref 134–434)
PMV BLD: 10.1 FL (ref 7.5–11.1)
POTASSIUM SERPLBLD-SCNC: 3.8 MMOL/L (ref 3.5–5.1)
PROT SERPL-MCNC: 6.4 G/DL (ref 6.4–8.2)
PROT UR QL STRIP: NEGATIVE
PROT UR QL STRIP: NEGATIVE
RBC # BLD AUTO: 4.62 M/MM3 (ref 4–5.6)
SODIUM SERPL-SCNC: 140 MMOL/L (ref 136–145)
SP GR UR: 1.02 (ref 1.01–1.03)
UROBILINOGEN UR STRIP-MCNC: 0.2 MG/DL (ref 0.2–1)
WBC # BLD AUTO: 9.4 K/MM3 (ref 4–10)

## 2020-01-03 RX ADMIN — HEPARIN SODIUM SCH UNIT: 5000 INJECTION, SOLUTION INTRAVENOUS; SUBCUTANEOUS at 21:13

## 2020-01-03 RX ADMIN — LEVETIRACETAM SCH MG: 100 INJECTION, SOLUTION, CONCENTRATE INTRAVENOUS at 09:43

## 2020-01-03 RX ADMIN — HEPARIN SODIUM SCH UNIT: 5000 INJECTION, SOLUTION INTRAVENOUS; SUBCUTANEOUS at 09:43

## 2020-01-03 RX ADMIN — LEVETIRACETAM SCH MG: 100 INJECTION, SOLUTION, CONCENTRATE INTRAVENOUS at 21:14

## 2020-01-03 NOTE — PN
Progress Note, Physician


Chief Complaint: 





ASLEEP


GIVEN MEDICATIONS IN E.R. AND NOW SEDATED


NO SEIZURE ACTIVITY AT [facility] POINT HERE IN HOSPITAL FLOOR





- Current Medication List


Current Medications: 


Active Medications





Heparin Sodium (Porcine) (Heparin -)  5,000 unit SQ BID [facility]


   Last Admin: 01/03/20 09:43 Dose:  5,000 unit


Sodium Chloride (Normal Saline -)  1,000 mls @ 100 mls/hr IV ASDIR [facility]


   Last Admin: 01/02/20 18:30 Dose:  100 mls/hr


Levetiracetam (Keppra Injection -)  1,500 mg IVPB BID [facility]


   Last Admin: 01/03/20 09:43 Dose:  1,500 mg


Lorazepam (Ativan Injection -)  2 mg IVPUSH Q6H PRN


   PRN Reason: seizures











- Objective


Vital Signs: 


 Vital Signs











Temperature  97.9 F   01/03/20 05:00


 


Pulse Rate  108 H  01/03/20 05:00


 


Respiratory Rate  18   01/03/20 05:00


 


Blood Pressure  102/85   01/03/20 05:00


 


O2 Sat by Pulse Oximetry (%)  100   01/02/20 20:00











Constitutional: Yes: No Distress


Cardiovascular: Yes: Regular Rate and Rhythm


Respiratory: Yes: WNL


Gastrointestinal: Yes: WNL


Genitourinary: Yes: WNL


Musculoskeletal: Yes: Other


Neurological: Yes: Other


Labs: 


 CBC, BMP





 01/03/20 05:50 





 01/03/20 05:50 











Problem List





- Problems


(1) Elevated transaminase level


Code(s): R74.0 - NONSPEC ELEV OF LEVELS OF TRANSAMNS & LACTIC ACID DEHYDRGNSE   





(2) Seizure


Code(s): R56.9 - UNSPECIFIED CONVULSIONS   


Qualifiers: 


   Convulsion type: unspecified   Qualified Code(s): R56.9 - Unspecified 

convulsions   





Assessment/Plan





SEIZURE RISKS APPLIED


NEUROLOGY EVAL


IVF


MONITOR ON TELE


CHECK LABS

## 2020-01-03 NOTE — EKG
Test Reason : 

Blood Pressure : ***/*** mmHG

Vent. Rate : 118 BPM     Atrial Rate : 118 BPM

   P-R Int : 150 ms          QRS Dur : 098 ms

    QT Int : 334 ms       P-R-T Axes : [phone] degrees

   QTc Int : 468 ms

 

SINUS TACHYCARDIA

OTHERWISE NORMAL ECG

WHEN COMPARED WITH ECG OF 19-MAY-2014 13:30,

NONSPECIFIC T WAVE ABNORMALITY HAS REPLACED INVERTED T WAVES IN 

INFERIOR LEADS

Confirmed by DAVIS GILLILAND MD ([id number]) on 1/3/2020 1:25:26 PM

 

Referred By:             Confirmed By:DAVIS GILLILAND MD

## 2020-01-03 NOTE — PN
Progress Note, Physician


History of Present Illness: 





events noted


chart reviewed


Alert awake oriented


Still feels sick to stomach


Mild nausea


no seizure activity on the higher Keppra





- Current Medication List


Current Medications: 


Active Medications





Heparin Sodium (Porcine) (Heparin -)  5,000 unit SQ BID [facility]


   Last Admin: 01/03/20 09:43 Dose:  5,000 unit


Levetiracetam (Keppra Injection -)  1,500 mg IVPB BID [facility]


   Last Admin: 01/03/20 09:43 Dose:  1,500 mg


Lorazepam (Ativan Injection -)  2 mg IVPUSH Q6H PRN


   PRN Reason: seizures











- Objective


Vital Signs: 


 Vital Signs











Temperature  97.8 F   01/03/20 18:00


 


Pulse Rate  88   01/03/20 18:00


 


Respiratory Rate  20 01/03/20 18:00


 


Blood Pressure  125/76   01/03/20 18:00


 


O2 Sat by Pulse Oximetry (%)  94 L  01/03/20 09:00











Constitutional: Yes: Well Nourished


Eyes: Yes: WNL


Neurological: Yes: Alert, Oriented, Babinski negative


...Motor Strength: WNL


Labs: 


 CBC, BMP





 01/03/20 05:50 





 01/03/20 05:50 











Problem List





- Problems


(1) Seizure


Assessment/Plan: 


11.  Seizure precautions.


2. Keppra the same.


3.  CAT scan of the abdomen


Code(s): R56.9 - UNSPECIFIED CONVULSIONS   


Qualifiers: 


   Convulsion type: unspecified   Qualified Code(s): R56.9 - Unspecified 

convulsions

## 2020-01-04 VITALS — HEART RATE: 91 BPM | TEMPERATURE: 98.1 F | DIASTOLIC BLOOD PRESSURE: 68 MMHG | SYSTOLIC BLOOD PRESSURE: 127 MMHG

## 2020-01-04 RX ADMIN — HEPARIN SODIUM SCH UNIT: 5000 INJECTION, SOLUTION INTRAVENOUS; SUBCUTANEOUS at 09:53

## 2020-01-04 RX ADMIN — LEVETIRACETAM SCH MG: 100 INJECTION, SOLUTION, CONCENTRATE INTRAVENOUS at 09:52

## 2020-01-04 NOTE — DS
Physical Examination


Vital Signs: 


 Vital Signs











Temperature  98.1 F   01/04/20 10:00


 


Pulse Rate  91 H  01/04/20 10:00


 


Respiratory Rate  18   01/04/20 10:00


 


Blood Pressure  127/68   01/04/20 10:00


 


O2 Sat by Pulse Oximetry (%)  96   01/04/20 09:00











Findings/Remarks: 





AWAKE ALERT DENIES HEADACHE FEVER OR CHILLS


Constitutional: Yes: No Distress


Cardiovascular: Yes: WNL


Respiratory: Yes: WNL


Musculoskeletal: Yes: WNL


Extremities: Yes: WNL


Edema: No


Neurological: Yes: WNL


...Motor Strength: WNL


Psychiatric: Yes: WNL


Labs: 


 CBC, BMP





 01/03/20 05:50 





 01/03/20 05:50 











Discharge Summary


Problems reviewed: Yes


Reason For Visit: SEIZURE


Current Active Problems





Elevated transaminase level (Acute)


Seizure (Acute)








Procedures: Principal: CT SCAN


Hospital Course: 


ADMITTED FOR SEIZURE D/O, MEDS ADJUSTED NEUROLOGY WORKUP DONE 


Plan of Treatment: 


KEPPRA 1250MG BID


F/U NEUROLOGY


Condition: Good





- Instructions


Diet, Activity, Other Instructions: 


SEE DR HOSEA LUO IN 3 DAYS


LOW FAT LOW SODIUM DIET


Disposition: HOME





- Home Medications


Comprehensive Discharge Medication List: 


Ambulatory Orders





Guaifenesin [Robitussin -] 10 ml PO PRN 01/02/20 


Multivitamin [Multiple Vitamins] 1 each PO DAILY 01/02/20 


Naproxen Sodium [Aleve] 220 mg PO PRN 01/02/20 


levETIRAcetam [Keppra -] 1,000 mg PO BID 01/02/20 


levETIRAcetam [Keppra -] 250 mg PO BID #60 tablet 01/04/20